# Patient Record
Sex: FEMALE | Race: BLACK OR AFRICAN AMERICAN | Employment: OTHER | RURAL
[De-identification: names, ages, dates, MRNs, and addresses within clinical notes are randomized per-mention and may not be internally consistent; named-entity substitution may affect disease eponyms.]

---

## 2017-01-10 ENCOUNTER — TELEPHONE (OUTPATIENT)
Dept: FAMILY MEDICINE CLINIC | Age: 72
End: 2017-01-10

## 2017-01-10 ENCOUNTER — OFFICE VISIT (OUTPATIENT)
Dept: FAMILY MEDICINE CLINIC | Age: 72
End: 2017-01-10

## 2017-01-10 VITALS
BODY MASS INDEX: 30.83 KG/M2 | HEIGHT: 63 IN | TEMPERATURE: 96.9 F | SYSTOLIC BLOOD PRESSURE: 132 MMHG | WEIGHT: 174 LBS | RESPIRATION RATE: 18 BRPM | OXYGEN SATURATION: 100 % | HEART RATE: 87 BPM | DIASTOLIC BLOOD PRESSURE: 71 MMHG

## 2017-01-10 DIAGNOSIS — J20.9 ACUTE BRONCHITIS, UNSPECIFIED ORGANISM: Primary | ICD-10-CM

## 2017-01-10 RX ORDER — BUDESONIDE AND FORMOTEROL FUMARATE DIHYDRATE 80; 4.5 UG/1; UG/1
2 AEROSOL RESPIRATORY (INHALATION) 2 TIMES DAILY
Qty: 1 INHALER | Refills: 5 | Status: SHIPPED | OUTPATIENT
Start: 2017-01-10

## 2017-01-10 RX ORDER — ALBUTEROL SULFATE 90 UG/1
2 AEROSOL, METERED RESPIRATORY (INHALATION)
Qty: 1 INHALER | Refills: 5 | Status: SHIPPED | OUTPATIENT
Start: 2017-01-10

## 2017-01-10 NOTE — TELEPHONE ENCOUNTER
Spoke with Evelina Shabazz at the [address] [place] [address]. Original rx for symbicort was $270.24, and albuterol inhaler was around $50. Ran Advair to check price and that would be more costly at $361. Please advise what you would like to change to.

## 2017-01-10 NOTE — PROGRESS NOTES
James Pandey is a 73 y.o. female who presents with the following:  Chief Complaint   Patient presents with    Fever    Cough     X1 week       Fever    The history is provided by the patient (Patient with a one-week history of cough and feverish with achy legs). Her temperature was unmeasured prior to arrival. Associated symptoms include muscle aches (In the legs) and cough. Pertinent negatives include no chest pain, no sleepiness, no diarrhea, no vomiting, no congestion, no headaches, no sore throat, no shortness of breath, no mental status change, no neck pain, no rash and no urinary symptoms. Cough   Pertinent negatives include no chest pain, no headaches and no shortness of breath. No Known Allergies    Current Outpatient Prescriptions   Medication Sig    albuterol (PROVENTIL HFA, VENTOLIN HFA, PROAIR HFA) 90 mcg/actuation inhaler Take 2 Puffs by inhalation every six (6) hours as needed for Wheezing. Indications: BRONCHOSPASM PREVENTION    budesonide-formoterol (SYMBICORT) 80-4.5 mcg/actuation HFAA inhaler Take 2 Puffs by inhalation two (2) times a day.  glimepiride (AMARYL) 4 mg tablet Take 1 Tab by mouth every morning.  lisinopril (PRINIVIL, ZESTRIL) 10 mg tablet TAKE 1 TABLET DAILY    pravastatin (PRAVACHOL) 80 mg tablet TAKE 1 TABLET NIGHTLY    hydroCHLOROthiazide (HYDRODIURIL) 25 mg tablet TAKE 1 TABLET DAILY    metFORMIN (GLUCOPHAGE) 1,000 mg tablet Take 1 Tab by mouth two (2) times daily (with meals).  aspirin 81 mg chewable tablet Take 81 mg by mouth daily.  Blood Glucose Control, Normal (ACCU-CHEK SMARTVIEW CONTRL SOL) soln Use as directed    Lancets misc Fastclix    alcohol swabs padm Use as directed    Blood-Glucose Meter (ACCU-CHEK PHONG) misc Use to check BS twice a day    glucose blood VI test strips (ACCU-CHEK SMARTVIEW TEST STRIP) strip Use to check BS twice a day     No current facility-administered medications for this visit.         Past Medical History Diagnosis Date    Diabetes ([address]     Hypercholesterolemia     Hypertension        No past surgical history on file. No family history on file. Social History     Social History    Marital status: SINGLE     Spouse name: N/A    Number of children: N/A    Years of education: N/A     Social History Main Topics    Smoking status: Never Smoker    Smokeless tobacco: Never Used    Alcohol use None    Drug use: None    Sexual activity: Not Asked     Other Topics Concern    None     Social History Narrative       Review of Systems   Constitutional: Positive for fever. HENT: Negative for congestion and sore throat. Respiratory: Positive for cough. Negative for shortness of breath. Cardiovascular: Negative for chest pain. Gastrointestinal: Negative for diarrhea and vomiting. Musculoskeletal: Negative for neck pain. Skin: Negative for rash. Neurological: Negative for headaches. Visit Vitals    /71 (BP 1 Location: Left arm, BP Patient Position: Sitting)    Pulse 87    Temp 96.9 °F (36.1 °C) (Oral)    Resp 18    Ht 5' 3\" (1.6 m)    Wt 174 lb (78.9 kg)    SpO2 100%    BMI 30.82 kg/m2     Physical Exam   Constitutional: She is oriented to person, place, and time and well-developed, well-nourished, and in no distress. HENT:   Head: Normocephalic and atraumatic. Right Ear: External ear normal.   Left Ear: External ear normal.   Mouth/Throat: Oropharynx is clear and moist.   Eyes: Conjunctivae and EOM are normal. Pupils are equal, round, and reactive to light. Right eye exhibits no discharge. Left eye exhibits no discharge. Neck: Normal range of motion. Neck supple. No tracheal deviation present. No thyromegaly present. Cardiovascular: Normal rate, regular rhythm, normal heart sounds and intact distal pulses. Exam reveals no gallop and no friction rub. No murmur heard. Pulmonary/Chest: Effort normal. No respiratory distress. She has wheezes. She has no rales.  She exhibits no tenderness. Abdominal: Soft. Bowel sounds are normal. She exhibits no distension and no mass. There is no tenderness. There is no rebound and no guarding. Musculoskeletal: She exhibits no edema or tenderness. Lymphadenopathy:     She has no cervical adenopathy. Neurological: She is alert and oriented to person, place, and time. She has normal reflexes. No cranial nerve deficit. She exhibits normal muscle tone. Gait normal. Coordination normal. GCS score is 15. Skin: Skin is warm and dry. No rash noted. No erythema. No pallor. Psychiatric: Mood, memory, affect and judgment normal.         ICD-10-CM ICD-9-CM    1. Acute bronchitis, unspecified organism J20.9 466.0 albuterol (PROVENTIL HFA, VENTOLIN HFA, PROAIR HFA) 90 mcg/actuation inhaler      budesonide-formoterol (SYMBICORT) 80-4.5 mcg/actuation HFAA inhaler     Patient is told to RTC as needed if she is not getting any better by next week or if her symptoms worsen  Orders Placed This Encounter    albuterol (PROVENTIL HFA, VENTOLIN HFA, PROAIR HFA) 90 mcg/actuation inhaler     Sig: Take 2 Puffs by inhalation every six (6) hours as needed for Wheezing. Indications: BRONCHOSPASM PREVENTION     Dispense:  1 Inhaler     Refill:  5    budesonide-formoterol (SYMBICORT) 80-4.5 mcg/actuation HFAA inhaler     Sig: Take 2 Puffs by inhalation two (2) times a day.      Dispense:  1 Inhaler     Refill:  5       Follow-up Disposition: Not on Marek Zapata MD

## 2017-01-10 NOTE — MR AVS SNAPSHOT
Visit Information Date & Time Provider Department Dept. Phone Encounter #  
 1/10/2017  1:20 PM Zay Tello MD [facility] [phone] [id number] Your Appointments 6/7/2017  7:40 AM  
Follow Up with Zay Tello MD  
[facility] Appt Note: 6 month f/u  
 [address] [facility] [phone]  
  
   
 [address] [facility] [phone] Upcoming Health Maintenance Date Due  
 EYE EXAM RETINAL OR DILATED Q1 4/8/2017 HEMOGLOBIN A1C Q6M 6/7/2017 Pneumococcal 65+ Low/Medium Risk (2 of 2 - PPSV23) 8/8/2017 MEDICARE YEARLY EXAM 8/9/2017 FOBT Q 1 YEAR AGE 50-75 8/30/2017 FOOT EXAM Q1 12/7/2017 MICROALBUMIN Q1 12/7/2017 LIPID PANEL Q1 12/7/2017 GLAUCOMA SCREENING Q2Y 4/8/2018 BREAST CANCER SCRN MAMMOGRAM 4/15/2018 DTaP/Tdap/Td series (2 - Td) 8/8/2026 Allergies as of 1/10/2017  Review Complete On: 1/10/2017 By: Zay Tello MD  
 No Known Allergies Current Immunizations  Never Reviewed No immunizations on file. Not reviewed this visit You Were Diagnosed With   
  
 Codes Comments Acute bronchitis, unspecified organism    -  Primary ICD-10-CM: J20.9 ICD-9-CM: 466.0 Vitals BP Pulse Temp Resp Height(growth percentile) Weight(growth percentile) 132/71 (BP 1 Location: Left arm, BP Patient Position: Sitting) 87 96.9 °F (36.1 °C) (Oral) 18 5' 3\" (1.6 m) 174 lb (78.9 kg) SpO2 BMI OB Status Smoking Status 100% 30.82 kg/m2 Postmenopausal Never Smoker Vitals History BMI and BSA Data Body Mass Index Body Surface Area  
 30.82 kg/m 2 1.87 m 2 Preferred Pharmacy Pharmacy Name Phone [facility] [address] [place], [address] Your Updated Medication List  
  
   
This list is accurate as of: 1/10/17  1:57 PM.  Always use your most recent med list.  
  
 albuterol 90 mcg/actuation inhaler Commonly known as:  PROVENTIL HFA, VENTOLIN HFA, PROAIR HFA Take 2 Puffs by inhalation every six (6) hours as needed for Wheezing. Indications: BRONCHOSPASM PREVENTION  
  
 alcohol swabs Padm Use as directed  
  
 aspirin 81 mg chewable tablet Take 81 mg by mouth daily. Blood Glucose Control, Normal Soln Commonly known as:  ACCU-CHEK SMARTVIEW CONTRL SOL Use as directed Blood-Glucose Meter Misc Commonly known as:  Othelia Browning Use to check BS twice a day  
  
 budesonide-formoterol 80-4.5 mcg/actuation Hfaa inhaler Commonly known as:  SYMBICORT Take 2 Puffs by inhalation two (2) times a day. glimepiride 4 mg tablet Commonly known as:  AMARYL Take 1 Tab by mouth every morning. glucose blood VI test strips strip Commonly known as:  [address] Use to check BS twice a day  
  
 hydroCHLOROthiazide 25 mg tablet Commonly known as:  HYDRODIURIL  
TAKE 1 TABLET DAILY Lancets Misc Fastclix  
  
 lisinopril 10 mg tablet Commonly known as:  PRINIVIL, ZESTRIL  
TAKE 1 TABLET DAILY  
  
 metFORMIN 1,000 mg tablet Commonly known as:  GLUCOPHAGE Take 1 Tab by mouth two (2) times daily (with meals). pravastatin 80 mg tablet Commonly known as:  PRAVACHOL  
TAKE 1 TABLET NIGHTLY Prescriptions Sent to Pharmacy Refills  
 albuterol (PROVENTIL HFA, VENTOLIN HFA, PROAIR HFA) 90 mcg/actuation inhaler 5 Sig: Take 2 Puffs by inhalation every six (6) hours as needed for Wheezing. Indications: BRONCHOSPASM PREVENTION Class: Normal  
 [facility] [address] Ph #: [phone] Route: Inhalation  
 budesonide-formoterol (SYMBICORT) 80-4.5 mcg/actuation HFAA inhaler 5 Sig: Take 2 Puffs by inhalation two (2) times a day. Class: Normal  
 [facility]acy: [facility] [place] [address] Ph #: [phone] Route: Inhalation Introducing [facility]! Elina Hanson introduces [employer] patient portal. Now you can access parts of your medical record, email your doctor's office, and request medication refills online. 1. In your internet browser, go to [email or web address] 2. Click on the First Time User? Click Here link in the Sign In box. You will see the New Member Sign Up page. 3. Enter your [employer] Access Code exactly as it appears below. You will not need to use this code after youve completed the sign-up process. If you do not sign up before the expiration date, you must request a new code. · [employer] Access Code: [email or web address] Expires: 3/7/2017  8:06 AM 
 
4. Enter the last four digits of your Social Security Number (xxxx) and Date of Birth (mm/dd/yyyy) as indicated and click Submit. You will be taken to the next sign-up page. 5. Create a [employer] ID. This will be your [employer] login ID and cannot be changed, so think of one that is secure and easy to remember. 6. Create a [employer] password. You can change your password at any time. 7. Enter your Password Reset Question and Answer. This can be used at a later time if you forget your password. 8. Enter your e-mail address. You will receive e-mail notification when new information is available in [address] 9. Click Sign Up. You can now view and download portions of your medical record. 10. Click the Download Summary menu link to download a portable copy of your medical information. If you have questions, please visit the Frequently Asked Questions section of the [employer] website. Remember, [employer] is NOT to be used for urgent needs. For medical emergencies, dial 911. Now available from your iPhone and Android! Please provide this summary of care documentation to your next provider. Your primary care clinician is listed as Carolin Seo.  If you have any questions after today's visit, please call [phone].

## 2017-01-11 NOTE — TELEPHONE ENCOUNTER
There is nothing else to treat her with other than either prednisone or Medrol as she will not use the inhalers because of their cost and these would be the best treatment for after that it is just the Medrol and the prednisone there is nothing else left to do

## 2017-01-12 RX ORDER — PREDNISONE 20 MG/1
TABLET ORAL
Qty: 30 TAB | Refills: 0 | Status: SHIPPED | OUTPATIENT
Start: 2017-01-12

## 2017-06-29 DIAGNOSIS — E78.5 DYSLIPIDEMIA: ICD-10-CM

## 2017-06-29 DIAGNOSIS — E11.42 TYPE 2 DIABETES MELLITUS WITH DIABETIC POLYNEUROPATHY, WITHOUT LONG-TERM CURRENT USE OF INSULIN (HCC): ICD-10-CM

## 2017-06-29 DIAGNOSIS — I10 ESSENTIAL HYPERTENSION: ICD-10-CM

## 2017-06-29 NOTE — TELEPHONE ENCOUNTER
Pt is going to schedule for the beginning of August so she can have her Annual Wellness Visit done also, so can we give her 45 pills or 30 with 1 refill so she doesn't have to come in July and then again in August

## 2017-06-30 RX ORDER — HYDROCHLOROTHIAZIDE 25 MG/1
TABLET ORAL
Qty: 30 TAB | Refills: 1 | Status: SHIPPED | OUTPATIENT
Start: 2017-06-30

## 2017-07-28 ENCOUNTER — DOCUMENTATION ONLY (OUTPATIENT)
Dept: FAMILY MEDICINE CLINIC | Age: 72
End: 2017-07-28

## 2017-07-28 NOTE — PROGRESS NOTES
Patient transferring to [place]. Called and explained to request outside records so clinicians can see patient's chart since they are now a part of Epic.

## 2019-11-19 NOTE — TELEPHONE ENCOUNTER
Since she had such a bad time with prednisone I would suggest that she just try her other medications and see if they would knock it out if not we will have to do a course of prednisone Negative